# Patient Record
Sex: FEMALE | Race: WHITE | NOT HISPANIC OR LATINO | URBAN - METROPOLITAN AREA
[De-identification: names, ages, dates, MRNs, and addresses within clinical notes are randomized per-mention and may not be internally consistent; named-entity substitution may affect disease eponyms.]

---

## 2017-02-17 ENCOUNTER — APPOINTMENT (RX ONLY)
Dept: URBAN - METROPOLITAN AREA OTHER 13 | Facility: OTHER | Age: 72
Setting detail: DERMATOLOGY
End: 2017-02-17

## 2017-02-17 DIAGNOSIS — L82.1 OTHER SEBORRHEIC KERATOSIS: ICD-10-CM

## 2017-02-17 DIAGNOSIS — L66.1 LICHEN PLANOPILARIS: ICD-10-CM

## 2017-02-17 DIAGNOSIS — L66.11 CLASSIC LICHEN PLANOPILARIS: ICD-10-CM

## 2017-02-17 PROCEDURE — ? INTRALESIONAL KENALOG

## 2017-02-17 PROCEDURE — ? OTHER

## 2017-02-17 PROCEDURE — ? TREATMENT REGIMEN

## 2017-02-17 PROCEDURE — 11901 INJECT SKIN LESIONS >7: CPT

## 2017-02-17 PROCEDURE — ? COUNSELING

## 2017-02-17 PROCEDURE — 99212 OFFICE O/P EST SF 10 MIN: CPT | Mod: 25

## 2017-02-17 ASSESSMENT — LOCATION DETAILED DESCRIPTION DERM
LOCATION DETAILED: POSTERIOR MID-PARIETAL SCALP
LOCATION DETAILED: RIGHT SUPERIOR PARIETAL SCALP
LOCATION DETAILED: LEFT CENTRAL PARIETAL SCALP
LOCATION DETAILED: LEFT SUPERIOR OCCIPITAL SCALP
LOCATION DETAILED: RIGHT CENTRAL PARIETAL SCALP
LOCATION DETAILED: LEFT SUPERIOR PARIETAL SCALP
LOCATION DETAILED: LEFT CENTRAL TEMPLE
LOCATION DETAILED: MID-OCCIPITAL SCALP
LOCATION DETAILED: RIGHT SUPERIOR OCCIPITAL SCALP
LOCATION DETAILED: LEFT CENTRAL FRONTAL SCALP

## 2017-02-17 ASSESSMENT — LOCATION SIMPLE DESCRIPTION DERM
LOCATION SIMPLE: POSTERIOR SCALP
LOCATION SIMPLE: LEFT TEMPLE
LOCATION SIMPLE: RIGHT OCCIPITAL SCALP
LOCATION SIMPLE: LEFT SCALP
LOCATION SIMPLE: SCALP
LOCATION SIMPLE: LEFT OCCIPITAL SCALP

## 2017-02-17 ASSESSMENT — LOCATION ZONE DERM
LOCATION ZONE: SCALP
LOCATION ZONE: FACE

## 2017-02-17 NOTE — PROCEDURE: INTRALESIONAL KENALOG
Consent: The risks of atrophy were reviewed with the patient.
Medical Necessity Clause: This procedure was medically necessary because the lesions that were treated were:
Detail Level: Detailed
Kenalog Preparation: Kenalog
Administered By (Optional): Dr. Corral
X Size Of Lesion In Cm (Optional): 0
Include Z78.9 (Other Specified Conditions Influencing Health Status) As An Associated Diagnosis?: No
Concentration Of Kenalog Solution Injected (Mg/Ml): 3.3

## 2017-02-17 NOTE — PROCEDURE: OTHER
Detail Level: Simple
Other (Free Text): red follicular papules
Note Text (......Xxx Chief Complaint.): This diagnosis correlates with the

## 2017-03-06 ENCOUNTER — RX ONLY (OUTPATIENT)
Age: 72
Setting detail: RX ONLY
End: 2017-03-06

## 2017-03-06 RX ORDER — FLUOCINONIDE 0.5 MG/ML
SOLUTION TOPICAL
Qty: 1 | Refills: 3 | Status: ERX

## 2017-04-21 ENCOUNTER — APPOINTMENT (RX ONLY)
Dept: URBAN - METROPOLITAN AREA OTHER 13 | Facility: OTHER | Age: 72
Setting detail: DERMATOLOGY
End: 2017-04-21

## 2017-04-21 DIAGNOSIS — L66.11 CLASSIC LICHEN PLANOPILARIS: ICD-10-CM

## 2017-04-21 DIAGNOSIS — L66.1 LICHEN PLANOPILARIS: ICD-10-CM

## 2017-04-21 PROCEDURE — ? INTRALESIONAL KENALOG

## 2017-04-21 PROCEDURE — ? TREATMENT REGIMEN

## 2017-04-21 PROCEDURE — 11900 INJECT SKIN LESIONS </W 7: CPT

## 2017-04-21 PROCEDURE — ? COUNSELING

## 2017-04-21 PROCEDURE — ? OTHER

## 2017-04-21 PROCEDURE — ? PRESCRIPTION

## 2017-04-21 RX ORDER — FLUOCINONIDE 0.5 MG/ML
SOLUTION TOPICAL QD
Qty: 1 | Refills: 3 | Status: ERX

## 2017-04-21 ASSESSMENT — LOCATION DETAILED DESCRIPTION DERM
LOCATION DETAILED: LEFT SUPERIOR PARIETAL SCALP
LOCATION DETAILED: LEFT CENTRAL PARIETAL SCALP
LOCATION DETAILED: RIGHT SUPERIOR PARIETAL SCALP
LOCATION DETAILED: RIGHT CENTRAL PARIETAL SCALP
LOCATION DETAILED: MID-OCCIPITAL SCALP

## 2017-04-21 ASSESSMENT — LOCATION SIMPLE DESCRIPTION DERM
LOCATION SIMPLE: POSTERIOR SCALP
LOCATION SIMPLE: SCALP

## 2017-04-21 ASSESSMENT — LOCATION ZONE DERM: LOCATION ZONE: SCALP

## 2017-04-21 NOTE — PROCEDURE: OTHER
Detail Level: Simple
Other (Free Text): red follicular erythema with scale, low level erythema in all affected areas with left parietal scalp not inflamed today.  Will continue to treat unless does not improve.  Consider excimer laser if not improved.
Note Text (......Xxx Chief Complaint.): This diagnosis correlates with the

## 2017-04-21 NOTE — PROCEDURE: INTRALESIONAL KENALOG
X Size Of Lesion In Cm (Optional): 0
Medical Necessity Clause: This procedure was medically necessary because the lesions that were treated were:
Concentration Of Kenalog Solution Injected (Mg/Ml): 2.0
Consent: The risks of atrophy were reviewed with the patient.
Administered By (Optional): Dr. Corral
Include Z78.9 (Other Specified Conditions Influencing Health Status) As An Associated Diagnosis?: No
Kenalog Preparation: Kenalog
Detail Level: Detailed
Total Volume (Ccs): 0.3

## 2017-06-15 ENCOUNTER — APPOINTMENT (RX ONLY)
Dept: URBAN - METROPOLITAN AREA OTHER 13 | Facility: OTHER | Age: 72
Setting detail: DERMATOLOGY
End: 2017-06-15

## 2017-06-15 DIAGNOSIS — L66.11 CLASSIC LICHEN PLANOPILARIS: ICD-10-CM | Status: IMPROVED

## 2017-06-15 DIAGNOSIS — L82.1 OTHER SEBORRHEIC KERATOSIS: ICD-10-CM

## 2017-06-15 DIAGNOSIS — L66.1 LICHEN PLANOPILARIS: ICD-10-CM | Status: IMPROVED

## 2017-06-15 PROCEDURE — ? COUNSELING

## 2017-06-15 PROCEDURE — ? TREATMENT REGIMEN

## 2017-06-15 PROCEDURE — 11901 INJECT SKIN LESIONS >7: CPT

## 2017-06-15 PROCEDURE — ? INTRALESIONAL KENALOG

## 2017-06-15 PROCEDURE — ? LIQUID NITROGEN (COSMETIC)

## 2017-06-15 ASSESSMENT — LOCATION ZONE DERM
LOCATION ZONE: FACE
LOCATION ZONE: SCALP

## 2017-06-15 ASSESSMENT — LOCATION DETAILED DESCRIPTION DERM
LOCATION DETAILED: RIGHT OCCIPITAL SCALP
LOCATION DETAILED: RIGHT MEDIAL FOREHEAD
LOCATION DETAILED: LEFT EYEBROW
LOCATION DETAILED: RIGHT SUPERIOR PARIETAL SCALP
LOCATION DETAILED: LEFT INFERIOR MEDIAL FOREHEAD
LOCATION DETAILED: RIGHT SUPERIOR OCCIPITAL SCALP
LOCATION DETAILED: MID-OCCIPITAL SCALP
LOCATION DETAILED: LEFT OCCIPITAL SCALP

## 2017-06-15 ASSESSMENT — LOCATION SIMPLE DESCRIPTION DERM
LOCATION SIMPLE: SCALP
LOCATION SIMPLE: POSTERIOR SCALP
LOCATION SIMPLE: LEFT EYEBROW
LOCATION SIMPLE: LEFT FOREHEAD
LOCATION SIMPLE: RIGHT FOREHEAD

## 2017-06-15 NOTE — PROCEDURE: LIQUID NITROGEN (COSMETIC)
Consent: The patient's consent was obtained including but not limited to risks of crusting, scabbing, blistering, scarring, darker or lighter pigmentary change, recurrence, incomplete removal and infection. The patient understands that the procedure is cosmetic in nature and is not covered by insurance.
Detail Level: Detailed
Post-Care Instructions: I reviewed with the patient in detail post-care instructions. Patient is to wear sunprotection, and avoid picking at any of the treated lesions. Pt may apply Vaseline to crusted or scabbing areas.
Price (Use Numbers Only, No Special Characters Or $): 0
Render Post-Care Instructions In Note?: no

## 2017-06-15 NOTE — PROCEDURE: INTRALESIONAL KENALOG
Medical Necessity Clause: This procedure was medically necessary because the lesions that were treated were:
Kenalog Preparation: Kenalog
Consent: The risks of atrophy were reviewed with the patient.
Include Z78.9 (Other Specified Conditions Influencing Health Status) As An Associated Diagnosis?: No
Total Volume (Ccs): 1.8
Detail Level: Detailed
Concentration Of Kenalog Solution Injected (Mg/Ml): 3.3
X Size Of Lesion In Cm (Optional): 0
Administered By (Optional): Dr. Corral

## 2017-08-15 ENCOUNTER — APPOINTMENT (RX ONLY)
Dept: URBAN - METROPOLITAN AREA OTHER 13 | Facility: OTHER | Age: 72
Setting detail: DERMATOLOGY
End: 2017-08-15

## 2017-08-15 DIAGNOSIS — L66.1 LICHEN PLANOPILARIS: ICD-10-CM

## 2017-08-15 DIAGNOSIS — L66.11 CLASSIC LICHEN PLANOPILARIS: ICD-10-CM

## 2017-08-15 PROCEDURE — ? TREATMENT REGIMEN

## 2017-08-15 PROCEDURE — ? COUNSELING

## 2017-08-15 PROCEDURE — ? INTRALESIONAL KENALOG

## 2017-08-15 PROCEDURE — 11901 INJECT SKIN LESIONS >7: CPT

## 2017-08-15 ASSESSMENT — LOCATION DETAILED DESCRIPTION DERM
LOCATION DETAILED: RIGHT SUPERIOR PARIETAL SCALP
LOCATION DETAILED: RIGHT CENTRAL PARIETAL SCALP

## 2017-08-15 ASSESSMENT — LOCATION SIMPLE DESCRIPTION DERM: LOCATION SIMPLE: SCALP

## 2017-08-15 ASSESSMENT — LOCATION ZONE DERM: LOCATION ZONE: SCALP

## 2017-11-03 ENCOUNTER — APPOINTMENT (RX ONLY)
Dept: URBAN - METROPOLITAN AREA OTHER 13 | Facility: OTHER | Age: 72
Setting detail: DERMATOLOGY
End: 2017-11-03

## 2017-11-03 DIAGNOSIS — D22 MELANOCYTIC NEVI: ICD-10-CM

## 2017-11-03 DIAGNOSIS — L66.11 CLASSIC LICHEN PLANOPILARIS: ICD-10-CM

## 2017-11-03 DIAGNOSIS — D485 NEOPLASM OF UNCERTAIN BEHAVIOR OF SKIN: ICD-10-CM

## 2017-11-03 DIAGNOSIS — L66.1 LICHEN PLANOPILARIS: ICD-10-CM

## 2017-11-03 DIAGNOSIS — L82.1 OTHER SEBORRHEIC KERATOSIS: ICD-10-CM

## 2017-11-03 PROBLEM — D22.5 MELANOCYTIC NEVI OF TRUNK: Status: ACTIVE | Noted: 2017-11-03

## 2017-11-03 PROBLEM — D48.5 NEOPLASM OF UNCERTAIN BEHAVIOR OF SKIN: Status: ACTIVE | Noted: 2017-11-03

## 2017-11-03 PROCEDURE — ? COUNSELING

## 2017-11-03 PROCEDURE — 11101: CPT

## 2017-11-03 PROCEDURE — 11900 INJECT SKIN LESIONS </W 7: CPT

## 2017-11-03 PROCEDURE — 99213 OFFICE O/P EST LOW 20 MIN: CPT | Mod: 25

## 2017-11-03 PROCEDURE — 11100: CPT

## 2017-11-03 PROCEDURE — ? LIQUID NITROGEN (COSMETIC)

## 2017-11-03 PROCEDURE — ? BIOPSY BY SHAVE METHOD

## 2017-11-03 PROCEDURE — ? TREATMENT REGIMEN

## 2017-11-03 PROCEDURE — ? INTRALESIONAL KENALOG

## 2017-11-03 ASSESSMENT — LOCATION DETAILED DESCRIPTION DERM
LOCATION DETAILED: RIGHT SUPERIOR UPPER BACK
LOCATION DETAILED: RIGHT FOREHEAD
LOCATION DETAILED: RIGHT CLAVICULAR NECK
LOCATION DETAILED: RIGHT INFERIOR MEDIAL UPPER BACK
LOCATION DETAILED: LEFT SUPERIOR CENTRAL BUCCAL CHEEK
LOCATION DETAILED: RIGHT INFERIOR CENTRAL MALAR CHEEK
LOCATION DETAILED: EPIGASTRIC SKIN
LOCATION DETAILED: RIGHT SUPERIOR PARIETAL SCALP
LOCATION DETAILED: RIGHT CENTRAL PARIETAL SCALP
LOCATION DETAILED: LEFT MEDIAL UPPER BACK

## 2017-11-03 ASSESSMENT — LOCATION SIMPLE DESCRIPTION DERM
LOCATION SIMPLE: RIGHT CHEEK
LOCATION SIMPLE: ABDOMEN
LOCATION SIMPLE: LEFT UPPER BACK
LOCATION SIMPLE: LEFT CHEEK
LOCATION SIMPLE: RIGHT UPPER BACK
LOCATION SIMPLE: RIGHT ANTERIOR NECK
LOCATION SIMPLE: SCALP
LOCATION SIMPLE: RIGHT FOREHEAD

## 2017-11-03 ASSESSMENT — LOCATION ZONE DERM
LOCATION ZONE: TRUNK
LOCATION ZONE: FACE
LOCATION ZONE: NECK
LOCATION ZONE: SCALP

## 2017-11-03 NOTE — PROCEDURE: INTRALESIONAL KENALOG
Detail Level: Detailed
Consent: The risks of atrophy were reviewed with the patient.
X Size Of Lesion In Cm (Optional): 0
Total Volume (Ccs): 1.5
Kenalog Preparation: Kenalog
Administered By (Optional): Dr. Corral
Medical Necessity Clause: This procedure was medically necessary because the lesions that were treated were:
Include Z78.9 (Other Specified Conditions Influencing Health Status) As An Associated Diagnosis?: No
Concentration Of Kenalog Solution Injected (Mg/Ml): 3.3

## 2017-11-03 NOTE — PROCEDURE: BIOPSY BY SHAVE METHOD
Type Of Destruction Used: Curettage
Billing Type: Third-Party Bill
Destruction After The Procedure: No
Biopsy Method: Dermablade
Curettage Text: The wound bed was treated with curettage after the biopsy was performed.
Detail Level: Detailed
X Size Of Lesion In Cm: 0
Electrodesiccation Text: The wound bed was treated with electrodesiccation after the biopsy was performed.
Consent: Written consent was obtained and risks were reviewed including but not limited to scarring, infection, bleeding, scabbing, incomplete removal, nerve damage and allergy to anesthesia.
Biopsy Type: H and E
Wound Care: Bacitracin
Dressing: bandage
Anesthesia Volume In Cc: 1
Electrodesiccation And Curettage Text: The wound bed was treated with electrodesiccation and curettage after the biopsy was performed.
Post-Care Instructions: I reviewed with the patient in detail post-care instructions. Patient is to keep the biopsy site dry overnight, and then apply bacitracin twice daily until healed. Patient may apply hydrogen peroxide soaks to remove any crusting.
Cryotherapy Text: The wound bed was treated with cryotherapy after the biopsy was performed.
Silver Nitrate Text: The wound bed was treated with silver nitrate after the biopsy was performed.
Anesthesia Type: 1% lidocaine with epinephrine
Lab Facility: [id number]
Lab: [id number]
Notification Instructions: Patient will be notified of biopsy results. However, patient instructed to call the office if not contacted within 2 weeks.
Hemostasis: Drysol

## 2018-02-15 ENCOUNTER — APPOINTMENT (RX ONLY)
Dept: URBAN - METROPOLITAN AREA OTHER 13 | Facility: OTHER | Age: 73
Setting detail: DERMATOLOGY
End: 2018-02-15

## 2018-02-15 DIAGNOSIS — L66.11 CLASSIC LICHEN PLANOPILARIS: ICD-10-CM | Status: IMPROVED

## 2018-02-15 DIAGNOSIS — L66.1 LICHEN PLANOPILARIS: ICD-10-CM | Status: IMPROVED

## 2018-02-15 PROCEDURE — 99212 OFFICE O/P EST SF 10 MIN: CPT

## 2018-02-15 PROCEDURE — ? TREATMENT REGIMEN

## 2018-02-15 PROCEDURE — ? COUNSELING

## 2018-08-31 NOTE — PROCEDURE: INTRALESIONAL KENALOG
See initial evaluation in POC.     Viridiana Cruz, PT, DPT  
Administered By (Optional): Dr. Corral
Medical Necessity Clause: This procedure was medically necessary because the lesions that were treated were:
X Size Of Lesion In Cm (Optional): 0
Concentration Of Kenalog Solution Injected (Mg/Ml): 3.3
Total Volume (Ccs): 2.0
Consent: The risks of atrophy were reviewed with the patient.
Detail Level: Detailed
Include Z78.9 (Other Specified Conditions Influencing Health Status) As An Associated Diagnosis?: No
Kenalog Preparation: Kenalog

## 2020-09-22 ENCOUNTER — OFFICE VISIT (OUTPATIENT)
Dept: RURAL CLINIC 6 | Facility: CLINIC | Age: 75
End: 2020-09-22
Payer: MEDICARE

## 2020-09-22 DIAGNOSIS — K59.00 CONSTIPATION: ICD-10-CM

## 2020-09-22 DIAGNOSIS — R19.7 DIARRHEA: ICD-10-CM

## 2020-09-22 PROCEDURE — G8420 CALC BMI NORM PARAMETERS: HCPCS | Performed by: INTERNAL MEDICINE

## 2020-09-22 PROCEDURE — 99214 OFFICE O/P EST MOD 30 MIN: CPT | Performed by: INTERNAL MEDICINE

## 2020-09-22 PROCEDURE — G8427 DOCREV CUR MEDS BY ELIG CLIN: HCPCS | Performed by: INTERNAL MEDICINE

## 2020-09-22 PROCEDURE — 1036F TOBACCO NON-USER: CPT | Performed by: INTERNAL MEDICINE

## 2020-09-22 PROCEDURE — 3017F COLORECTAL CA SCREEN DOC REV: CPT | Performed by: INTERNAL MEDICINE

## 2020-09-22 RX ORDER — HYDROCHLOROTHIAZIDE 25 MG/1
TABLET ORAL
Qty: 0 | Refills: 0 | Status: ACTIVE | COMMUNITY
Start: 1900-01-01

## 2020-09-22 RX ORDER — ASPIRIN 81 MG/1
TAKE 1 TABLET (81 MG) BY ORAL ROUTE ONCE DAILY TABLET, COATED ORAL 1
Qty: 0 | Refills: 0 | Status: ACTIVE | COMMUNITY
Start: 1900-01-01

## 2020-09-22 RX ORDER — CHLORHEXIDINE GLUCONATE 4 %
TAKE 1 TABLET BY ORAL ROUTE DAILY LIQUID (ML) TOPICAL 1
Qty: 0 | Refills: 0 | Status: ACTIVE | COMMUNITY
Start: 1900-01-01

## 2020-09-22 RX ORDER — VENLAFAXINE HYDROCHLORIDE 75 MG/1
TABLET ORAL
Qty: 0 | Refills: 0 | Status: ACTIVE | COMMUNITY
Start: 1900-01-01

## 2020-09-22 RX ORDER — ZOLPIDEM TARTRATE 5 MG/1
TABLET, FILM COATED ORAL
Qty: 0 | Refills: 0 | Status: ACTIVE | COMMUNITY
Start: 1900-01-01

## 2020-09-22 RX ORDER — TRAZODONE HYDROCHLORIDE 150 MG/1
TAKE 1 TABLET BY ORAL ROUTE DAILY TABLET ORAL 1
Qty: 0 | Refills: 0 | Status: ACTIVE | COMMUNITY
Start: 1900-01-01

## 2020-09-22 RX ORDER — VENLAFAXINE HYDROCHLORIDE 75 MG/1
TAKE 1 CAPSULE (75 MG) BY ORAL ROUTE ONCE DAILY CAPSULE, EXTENDED RELEASE ORAL 1
Qty: 0 | Refills: 0 | Status: ACTIVE | COMMUNITY
Start: 1900-01-01

## 2020-09-22 RX ORDER — ALBUTEROL SULFATE 90 UG/1
AEROSOL, METERED RESPIRATORY (INHALATION)
Qty: 0 | Refills: 0 | Status: ACTIVE | COMMUNITY
Start: 1900-01-01

## 2020-09-22 NOTE — PHYSICAL EXAM CARDIOVASCULAR:
[FreeTextEntry1] : It    appears    that  the    patient's  cognitive complaints  are    related    to the    mood  issues. She    reports     a  h/o  depression;  haven't  been    seen   by    the    mood    specialist    for    a  while.\par She    will  be    referred    to  Psychiatrist  and   Psychologist.  She    has   an  appointment     with   Endocrinologist.   no edema, no murmurs, regular rate and rhythm

## 2020-09-22 NOTE — HPI-OTHER HISTORIES
>>> prior visits  This is a follow-up appointment for this patient, a 74 year old /White female, after a previous visit on 03/27/2018, for an evaluation for irritable bowel syndrome. The patient's irritable bowel syndrome is constipation predominant with a recent short lived bout of diarrhea. This has now resolved.. Currently, the patient has complaints of constipation. The patient's symptoms are currently constipation after recently having had diarrhea. She is now back to being constipated and she's taking Amitiza every 2 to 3 days. If she takes the Amitiza every day, she develops diarrhea. The patient Is taking their prescribed medications as listed in the data base of the EMR.. She denies any other significant GI history The patient has had previous colonoscopy. The colonoscopy was performed approximately 2 years ago. The colonoscopy showed no significant abnormality. She says that lately her stools have been black in color. She has not been takinbg Pepto Bismol or iron. No abdominal pain, nausea, vomiting, rectal bleeding or melena. Appetite is fine and weight is stable. No fever, chills or sweats. No heartburn, regurgitation, dysphagia, coughing or throat clearing.   >> 12/10/2019 Doing well, regular bowels with SHAY only, no Amitiza at present. No abd pain or UGI symptoms. no bleeding.

## 2020-09-22 NOTE — HPI-TODAY'S VISIT:
Follow-up of alternating constipation and diarrhea She tends toward constipation when she takes any type of laxatives including stool softeners.  She then has frequent diarrhea and frequent episodes of incontinence including nocturnal incontinence.  There is a vague cramping in the lower abdomen but no severe pain.  There is been no GI bleeding.  She has gained weight recently.  Her last colonoscopy in 2017 was normal including multiple biopsies of colon and ileum.  She has had intermittent chronic diarrhea for a number of years.  The incontinence however is a recent issue.  She has no history of rectal injury rectal surgery or low back problems.  There is no urinary incontinence.

## 2020-11-17 ENCOUNTER — WEB ENCOUNTER (OUTPATIENT)
Dept: RURAL CLINIC 6 | Facility: CLINIC | Age: 75
End: 2020-11-17

## 2020-11-17 ENCOUNTER — OFFICE VISIT (OUTPATIENT)
Dept: RURAL CLINIC 6 | Facility: CLINIC | Age: 75
End: 2020-11-17
Payer: MEDICARE

## 2020-11-17 DIAGNOSIS — R19.7 DIARRHEA: ICD-10-CM

## 2020-11-17 DIAGNOSIS — K59.00 CONSTIPATION: ICD-10-CM

## 2020-11-17 PROCEDURE — G8427 DOCREV CUR MEDS BY ELIG CLIN: HCPCS | Performed by: INTERNAL MEDICINE

## 2020-11-17 PROCEDURE — G8482 FLU IMMUNIZE ORDER/ADMIN: HCPCS | Performed by: INTERNAL MEDICINE

## 2020-11-17 PROCEDURE — G9903 PT SCRN TBCO ID AS NON USER: HCPCS | Performed by: INTERNAL MEDICINE

## 2020-11-17 PROCEDURE — G8420 CALC BMI NORM PARAMETERS: HCPCS | Performed by: INTERNAL MEDICINE

## 2020-11-17 PROCEDURE — 99213 OFFICE O/P EST LOW 20 MIN: CPT | Performed by: INTERNAL MEDICINE

## 2020-11-17 RX ORDER — ZOLPIDEM TARTRATE 5 MG/1
TABLET, FILM COATED ORAL
Qty: 0 | Refills: 0 | Status: ACTIVE | COMMUNITY
Start: 1900-01-01

## 2020-11-17 RX ORDER — ASPIRIN 81 MG/1
TAKE 1 TABLET (81 MG) BY ORAL ROUTE ONCE DAILY TABLET, COATED ORAL 1
Qty: 0 | Refills: 0 | Status: ACTIVE | COMMUNITY
Start: 1900-01-01

## 2020-11-17 RX ORDER — ALBUTEROL SULFATE 90 UG/1
AEROSOL, METERED RESPIRATORY (INHALATION)
Qty: 0 | Refills: 0 | Status: ACTIVE | COMMUNITY
Start: 1900-01-01

## 2020-11-17 RX ORDER — HYDROCHLOROTHIAZIDE 25 MG/1
TABLET ORAL
Qty: 0 | Refills: 0 | Status: ACTIVE | COMMUNITY
Start: 1900-01-01

## 2020-11-17 RX ORDER — CHLORHEXIDINE GLUCONATE 4 %
TAKE 1 TABLET BY ORAL ROUTE DAILY LIQUID (ML) TOPICAL 1
Qty: 0 | Refills: 0 | Status: ACTIVE | COMMUNITY
Start: 1900-01-01

## 2020-11-17 RX ORDER — TRAZODONE HYDROCHLORIDE 150 MG/1
TAKE 1 TABLET BY ORAL ROUTE DAILY TABLET ORAL 1
Qty: 0 | Refills: 0 | Status: ACTIVE | COMMUNITY
Start: 1900-01-01

## 2020-11-17 RX ORDER — VENLAFAXINE HYDROCHLORIDE 75 MG/1
TABLET ORAL
Qty: 0 | Refills: 0 | Status: ACTIVE | COMMUNITY
Start: 1900-01-01

## 2020-11-17 RX ORDER — VENLAFAXINE HYDROCHLORIDE 75 MG/1
TAKE 1 CAPSULE (75 MG) BY ORAL ROUTE ONCE DAILY CAPSULE, EXTENDED RELEASE ORAL 1
Qty: 0 | Refills: 0 | Status: ACTIVE | COMMUNITY
Start: 1900-01-01

## 2020-11-17 NOTE — PHYSICAL EXAM HENT:
Head, normocephalic, atraumatic, Face, Face within normal limits, Ears, External ears within normal limits, Nose/Nasopharynx, External nose  normal appearance, nares patent, no nasal discharge, Mouth and Throat, Oral cavity appearance normal, Breath odor normal, Lips, Appearance normal no

## 2020-11-17 NOTE — HPI-TODAY'S VISIT:
Follow-up alternating constipation and diarrhea.  She has had minimal constipation recently.  She occasionally has diarrhea which describes 2-3 soft to loose stools per day.  There have been no incontinence episodes, abdominal pain, or nocturnal stools.  There is been no bleeding.  She otherwise feels healthy.  >>last visit   Follow-up of alternating constipation and diarrhea She tends toward constipation when she takes any type of laxatives including stool softeners.  She then has frequent diarrhea and frequent episodes of incontinence including nocturnal incontinence.  There is a vague cramping in the lower abdomen but no severe pain.  There is been no GI bleeding.  She has gained weight recently.  Her last colonoscopy in 2017 was normal including multiple biopsies of colon and ileum.  She has had intermittent chronic diarrhea for a number of years.  The incontinence however is a recent issue.  She has no history of rectal injury rectal surgery or low back problems.  There is no urinary incontinence.

## 2021-05-18 ENCOUNTER — OFFICE VISIT (OUTPATIENT)
Dept: RURAL CLINIC 6 | Facility: CLINIC | Age: 76
End: 2021-05-18
Payer: MEDICARE

## 2021-05-18 ENCOUNTER — WEB ENCOUNTER (OUTPATIENT)
Dept: RURAL CLINIC 6 | Facility: CLINIC | Age: 76
End: 2021-05-18

## 2021-05-18 DIAGNOSIS — R19.7 DIARRHEA: ICD-10-CM

## 2021-05-18 DIAGNOSIS — K59.00 CONSTIPATION: ICD-10-CM

## 2021-05-18 PROCEDURE — 99213 OFFICE O/P EST LOW 20 MIN: CPT | Performed by: INTERNAL MEDICINE

## 2021-05-18 RX ORDER — HYDROCHLOROTHIAZIDE 25 MG/1
TABLET ORAL
Qty: 0 | Refills: 0 | Status: ACTIVE | COMMUNITY
Start: 1900-01-01

## 2021-05-18 RX ORDER — ASPIRIN 81 MG/1
TAKE 1 TABLET (81 MG) BY ORAL ROUTE ONCE DAILY TABLET, COATED ORAL 1
Qty: 0 | Refills: 0 | Status: ACTIVE | COMMUNITY
Start: 1900-01-01

## 2021-05-18 RX ORDER — VENLAFAXINE HYDROCHLORIDE 75 MG/1
TAKE 1 CAPSULE (75 MG) BY ORAL ROUTE ONCE DAILY CAPSULE, EXTENDED RELEASE ORAL 1
Qty: 0 | Refills: 0 | Status: ACTIVE | COMMUNITY
Start: 1900-01-01

## 2021-05-18 RX ORDER — TRAZODONE HYDROCHLORIDE 150 MG/1
TAKE 1 TABLET BY ORAL ROUTE DAILY TABLET ORAL 1
Qty: 0 | Refills: 0 | Status: ACTIVE | COMMUNITY
Start: 1900-01-01

## 2021-05-18 RX ORDER — ZOLPIDEM TARTRATE 5 MG/1
TABLET, FILM COATED ORAL
Qty: 0 | Refills: 0 | Status: ACTIVE | COMMUNITY
Start: 1900-01-01

## 2021-05-18 RX ORDER — VENLAFAXINE HYDROCHLORIDE 75 MG/1
TABLET ORAL
Qty: 0 | Refills: 0 | Status: ACTIVE | COMMUNITY
Start: 1900-01-01

## 2021-05-18 RX ORDER — ALBUTEROL SULFATE 90 UG/1
AEROSOL, METERED RESPIRATORY (INHALATION)
Qty: 0 | Refills: 0 | Status: ACTIVE | COMMUNITY
Start: 1900-01-01

## 2021-05-18 RX ORDER — CHLORHEXIDINE GLUCONATE 4 %
TAKE 1 TABLET BY ORAL ROUTE DAILY LIQUID (ML) TOPICAL 1
Qty: 0 | Refills: 0 | Status: ACTIVE | COMMUNITY
Start: 1900-01-01

## 2021-05-18 NOTE — HPI-TODAY'S VISIT:
75-year-old woman with constipation and occasional diarrhea.  She was last seen last year and previously had a colonoscopy 2017 that was unremarkable.  She has had no bleeding.  She has rare diarrhea at this time predominantly constipation.  She does not take laxatives fearing diarrhea recurrence.  There have been no ongoing cardiac or respiratory problems or other issues.  Her health in general has been fairly good. Follow-up alternating constipation and diarrhea.  She has had minimal constipation recently.  She occasionally has diarrhea which describes 2-3 soft to loose stools per day.  There have been no incontinence episodes, abdominal pain, or nocturnal stools.  There is been no bleeding.  She otherwise feels healthy.  >>last visit   Follow-up of alternating constipation and diarrhea She tends toward constipation when she takes any type of laxatives including stool softeners.  She then has frequent diarrhea and frequent episodes of incontinence including nocturnal incontinence.  There is a vague cramping in the lower abdomen but no severe pain.  There is been no GI bleeding.  She has gained weight recently.  Her last colonoscopy in 2017 was normal including multiple biopsies of colon and ileum.  She has had intermittent chronic diarrhea for a number of years.  The incontinence however is a recent issue.  She has no history of rectal injury rectal surgery or low back problems.  There is no urinary incontinence.

## 2021-09-21 ENCOUNTER — WEB ENCOUNTER (OUTPATIENT)
Dept: RURAL CLINIC 2 | Facility: CLINIC | Age: 76
End: 2021-09-21

## 2021-09-21 ENCOUNTER — OFFICE VISIT (OUTPATIENT)
Dept: RURAL CLINIC 6 | Facility: CLINIC | Age: 76
End: 2021-09-21
Payer: MEDICARE

## 2021-09-21 DIAGNOSIS — R19.8 ALTERNATING CONSTIPATION AND DIARRHEA: ICD-10-CM

## 2021-09-21 DIAGNOSIS — K57.30 DIVERTICULOSIS LARGE INTESTINE W/O PERFORATION OR ABSCESS W/O BLEEDING: ICD-10-CM

## 2021-09-21 PROBLEM — 724538004: Status: ACTIVE | Noted: 2021-09-21

## 2021-09-21 PROCEDURE — 99213 OFFICE O/P EST LOW 20 MIN: CPT | Performed by: INTERNAL MEDICINE

## 2021-09-21 RX ORDER — TRAZODONE HYDROCHLORIDE 150 MG/1
TAKE 1 TABLET BY ORAL ROUTE DAILY TABLET ORAL 1
Qty: 0 | Refills: 0 | Status: ACTIVE | COMMUNITY
Start: 1900-01-01

## 2021-09-21 RX ORDER — ASPIRIN 81 MG/1
TAKE 1 TABLET (81 MG) BY ORAL ROUTE ONCE DAILY TABLET, COATED ORAL 1
Qty: 0 | Refills: 0 | Status: ACTIVE | COMMUNITY
Start: 1900-01-01

## 2021-09-21 RX ORDER — VENLAFAXINE HYDROCHLORIDE 75 MG/1
TABLET ORAL
Qty: 0 | Refills: 0 | Status: ACTIVE | COMMUNITY
Start: 1900-01-01

## 2021-09-21 RX ORDER — HYDROCHLOROTHIAZIDE 25 MG/1
TABLET ORAL
Qty: 0 | Refills: 0 | Status: ACTIVE | COMMUNITY
Start: 1900-01-01

## 2021-09-21 RX ORDER — ALBUTEROL SULFATE 90 UG/1
AEROSOL, METERED RESPIRATORY (INHALATION)
Qty: 0 | Refills: 0 | Status: ACTIVE | COMMUNITY
Start: 1900-01-01

## 2021-09-21 RX ORDER — VENLAFAXINE HYDROCHLORIDE 75 MG/1
TAKE 1 CAPSULE (75 MG) BY ORAL ROUTE ONCE DAILY CAPSULE, EXTENDED RELEASE ORAL 1
Qty: 0 | Refills: 0 | Status: ACTIVE | COMMUNITY
Start: 1900-01-01

## 2021-09-21 RX ORDER — CHLORHEXIDINE GLUCONATE 4 %
TAKE 1 TABLET BY ORAL ROUTE DAILY LIQUID (ML) TOPICAL 1
Qty: 0 | Refills: 0 | Status: ACTIVE | COMMUNITY
Start: 1900-01-01

## 2021-09-21 RX ORDER — ZOLPIDEM TARTRATE 5 MG/1
TABLET, FILM COATED ORAL
Qty: 0 | Refills: 0 | Status: ACTIVE | COMMUNITY
Start: 1900-01-01

## 2021-09-21 NOTE — HPI-TODAY'S VISIT:
75-year-old female patient of Dr. Mendoza.  She has had alternating bowel habits beginning with significant constipation over a number of days after which she took MiraLAX last week.  Since then she has had small loose stools possibly with incomplete evacuation.  She has no prior history of fecal impaction.  She has no distention or abdominal pain other than a vague discomfort her last colonoscopy was about 2011 she had 2000 diverticulitis in 2014 she had AVMs with her colon in 2011.  She has had no bleeding.  Her weight has been stable.

## 2022-08-02 ENCOUNTER — OFFICE VISIT (OUTPATIENT)
Dept: RURAL CLINIC 6 | Facility: CLINIC | Age: 77
End: 2022-08-02
Payer: MEDICARE

## 2022-08-02 VITALS
HEIGHT: 62 IN | DIASTOLIC BLOOD PRESSURE: 79 MMHG | SYSTOLIC BLOOD PRESSURE: 128 MMHG | BODY MASS INDEX: 24.48 KG/M2 | TEMPERATURE: 98.2 F | WEIGHT: 133 LBS | HEART RATE: 65 BPM

## 2022-08-02 DIAGNOSIS — K62.89 RECTAL PAIN: ICD-10-CM

## 2022-08-02 DIAGNOSIS — K59.00 CONSTIPATION, UNSPECIFIED CONSTIPATION TYPE: ICD-10-CM

## 2022-08-02 PROBLEM — 14760008: Status: ACTIVE | Noted: 2022-08-02

## 2022-08-02 PROCEDURE — 99213 OFFICE O/P EST LOW 20 MIN: CPT | Performed by: INTERNAL MEDICINE

## 2022-08-02 RX ORDER — TRAZODONE HYDROCHLORIDE 150 MG/1
TAKE 1 TABLET BY ORAL ROUTE DAILY TABLET ORAL 1
Qty: 0 | Refills: 0 | Status: ACTIVE | COMMUNITY
Start: 1900-01-01

## 2022-08-02 RX ORDER — VENLAFAXINE HYDROCHLORIDE 75 MG/1
TABLET ORAL
Qty: 0 | Refills: 0 | Status: ACTIVE | COMMUNITY
Start: 1900-01-01

## 2022-08-02 RX ORDER — ZOLPIDEM TARTRATE 5 MG/1
TABLET, FILM COATED ORAL
Qty: 0 | Refills: 0 | Status: ACTIVE | COMMUNITY
Start: 1900-01-01

## 2022-08-02 RX ORDER — CHLORHEXIDINE GLUCONATE 4 %
TAKE 1 TABLET BY ORAL ROUTE DAILY LIQUID (ML) TOPICAL 1
Qty: 0 | Refills: 0 | Status: ACTIVE | COMMUNITY
Start: 1900-01-01

## 2022-08-02 RX ORDER — HYDROCHLOROTHIAZIDE 25 MG/1
TABLET ORAL
Qty: 0 | Refills: 0 | Status: DISCONTINUED | COMMUNITY
Start: 1900-01-01

## 2022-08-02 RX ORDER — VENLAFAXINE HYDROCHLORIDE 75 MG/1
TAKE 1 CAPSULE (75 MG) BY ORAL ROUTE ONCE DAILY CAPSULE, EXTENDED RELEASE ORAL 1
Qty: 0 | Refills: 0 | Status: ACTIVE | COMMUNITY
Start: 1900-01-01

## 2022-08-02 RX ORDER — ASPIRIN 81 MG/1
TAKE 1 TABLET (81 MG) BY ORAL ROUTE ONCE DAILY TABLET, COATED ORAL 1
Qty: 0 | Refills: 0 | Status: ACTIVE | COMMUNITY
Start: 1900-01-01

## 2022-08-02 RX ORDER — ALBUTEROL SULFATE 90 UG/1
AEROSOL, METERED RESPIRATORY (INHALATION)
Qty: 0 | Refills: 0 | Status: ACTIVE | COMMUNITY
Start: 1900-01-01

## 2022-08-02 NOTE — HPI-TODAY'S VISIT:
Generally has been doing well until recent episode palpitations was seen at ER and found to have hypokalemia.  This was thought to be caused by HCTZ now stopped  she had small amounts of diarrhea but mostly constipation and takes a laxative at intervals.  She feels like she probably has complete evacuation but recently has noted some sharp rectal discomfort whenever she moves her bowels which stops is immediately after the bowel movement with no residual soreness and no apparent mass or tenderness around the rectal opening.  Last colonoscopy was several years ago with AVMs in the past but currently she is having no bleeding.  K+ has been repleted and is being followed by her primary doctor.

## 2022-12-06 ENCOUNTER — OFFICE VISIT (OUTPATIENT)
Dept: RURAL CLINIC 6 | Facility: CLINIC | Age: 77
End: 2022-12-06

## 2022-12-06 RX ORDER — TRAZODONE HYDROCHLORIDE 150 MG/1
TAKE 1 TABLET BY ORAL ROUTE DAILY TABLET ORAL 1
Qty: 0 | Refills: 0 | COMMUNITY
Start: 1900-01-01

## 2022-12-06 RX ORDER — VENLAFAXINE HYDROCHLORIDE 75 MG/1
TAKE 1 CAPSULE (75 MG) BY ORAL ROUTE ONCE DAILY CAPSULE, EXTENDED RELEASE ORAL 1
Qty: 0 | Refills: 0 | COMMUNITY
Start: 1900-01-01

## 2022-12-06 RX ORDER — ASPIRIN 81 MG/1
TAKE 1 TABLET (81 MG) BY ORAL ROUTE ONCE DAILY TABLET, COATED ORAL 1
Qty: 0 | Refills: 0 | COMMUNITY
Start: 1900-01-01

## 2022-12-06 RX ORDER — ZOLPIDEM TARTRATE 5 MG/1
TABLET, FILM COATED ORAL
Qty: 0 | Refills: 0 | COMMUNITY
Start: 1900-01-01

## 2022-12-06 RX ORDER — VENLAFAXINE HYDROCHLORIDE 75 MG/1
TABLET ORAL
Qty: 0 | Refills: 0 | COMMUNITY
Start: 1900-01-01

## 2022-12-06 RX ORDER — ALBUTEROL SULFATE 90 UG/1
AEROSOL, METERED RESPIRATORY (INHALATION)
Qty: 0 | Refills: 0 | COMMUNITY
Start: 1900-01-01

## 2022-12-06 RX ORDER — CHLORHEXIDINE GLUCONATE 4 %
TAKE 1 TABLET BY ORAL ROUTE DAILY LIQUID (ML) TOPICAL 1
Qty: 0 | Refills: 0 | COMMUNITY
Start: 1900-01-01

## 2023-07-27 ENCOUNTER — OFFICE VISIT (OUTPATIENT)
Dept: URBAN - METROPOLITAN AREA CLINIC 54 | Facility: CLINIC | Age: 78
End: 2023-07-27
Payer: MEDICARE

## 2023-07-27 VITALS
TEMPERATURE: 96.46 F | SYSTOLIC BLOOD PRESSURE: 121 MMHG | WEIGHT: 143.8 LBS | DIASTOLIC BLOOD PRESSURE: 69 MMHG | HEART RATE: 60 BPM | HEIGHT: 62 IN | BODY MASS INDEX: 26.46 KG/M2

## 2023-07-27 DIAGNOSIS — R19.4 CHANGE IN BOWEL HABITS: ICD-10-CM

## 2023-07-27 PROCEDURE — 99213 OFFICE O/P EST LOW 20 MIN: CPT | Performed by: STUDENT IN AN ORGANIZED HEALTH CARE EDUCATION/TRAINING PROGRAM

## 2023-07-27 RX ORDER — VENLAFAXINE HYDROCHLORIDE 75 MG/1
TABLET ORAL
Qty: 0 | Refills: 0 | COMMUNITY
Start: 1900-01-01

## 2023-07-27 RX ORDER — VENLAFAXINE HYDROCHLORIDE 75 MG/1
TAKE 1 CAPSULE (75 MG) BY ORAL ROUTE ONCE DAILY CAPSULE, EXTENDED RELEASE ORAL 1
Qty: 0 | Refills: 0 | COMMUNITY
Start: 1900-01-01

## 2023-07-27 NOTE — HPI-TODAY'S VISIT:
--8/2/2022-- Generally has been doing well until recent episode palpitations was seen at ER and found to have hypokalemia.  This was thought to be caused by HCTZ now stopped  she had small amounts of diarrhea but mostly constipation and takes a laxative at intervals.  She feels like she probably has complete evacuation but recently has noted some sharp rectal discomfort whenever she moves her bowels which stops is immediately after the bowel movement with no residual soreness and no apparent mass or tenderness around the rectal opening.  Last colonoscopy was several years ago with AVMs in the past but currently she is having no bleeding.  K+ has been repleted and is being followed by her primary doctor.  -7/27/2023-  Patient returns today with plaint of change in bowel habits.  She states she had acute diarrhea for which she was given antibiotics.  Diarrhea improved but now she developed constipation.  She will need Colace to have a bowel movement.  She is having a bowel movement every 4 days they can only be achieved with the help of Colace.  Recently, she has been able to have bowel movements without the help of Colace.  She think she is overall improving.  She denies any rectal bleeding.  She has baseline lower abdominal crampy pain.

## 2023-07-27 NOTE — EXAM-PHYSICAL EXAM
General: Well appearing. No acute distress. HEENT: Anicteric sclerae Cardiovascular: Normal heart rate Respiratory: Breathing comfortably without conversational dyspnea Abdomen:  non-distended, soft, non-tender Rectal: Deferred Skin: without visible rashes on examined areas. Musculoskeletal: poor balance when ambulated. Labored ambulation. Neuro: No gross focal deficits. Alert and oriented. Psych: Appropriate mood and affect.

## 2023-12-05 ENCOUNTER — OFFICE VISIT (OUTPATIENT)
Dept: URBAN - METROPOLITAN AREA CLINIC 54 | Facility: CLINIC | Age: 78
End: 2023-12-05

## 2023-12-05 RX ORDER — VENLAFAXINE HYDROCHLORIDE 75 MG/1
TAKE 1 CAPSULE (75 MG) BY ORAL ROUTE ONCE DAILY CAPSULE, EXTENDED RELEASE ORAL 1
Qty: 0 | Refills: 0 | COMMUNITY
Start: 1900-01-01

## 2023-12-05 RX ORDER — VENLAFAXINE HYDROCHLORIDE 75 MG/1
TABLET ORAL
Qty: 0 | Refills: 0 | COMMUNITY
Start: 1900-01-01

## 2023-12-15 ENCOUNTER — CLAIMS CREATED FROM THE CLAIM WINDOW (OUTPATIENT)
Dept: URBAN - METROPOLITAN AREA CLINIC 54 | Facility: CLINIC | Age: 78
End: 2023-12-15
Payer: MEDICARE

## 2023-12-15 VITALS
WEIGHT: 142 LBS | TEMPERATURE: 97.3 F | DIASTOLIC BLOOD PRESSURE: 71 MMHG | SYSTOLIC BLOOD PRESSURE: 101 MMHG | HEIGHT: 62 IN | HEART RATE: 72 BPM | BODY MASS INDEX: 26.13 KG/M2

## 2023-12-15 DIAGNOSIS — K59.00 CONSTIPATION: ICD-10-CM

## 2023-12-15 DIAGNOSIS — K59.01 CONSTIPATION: ICD-10-CM

## 2023-12-15 PROCEDURE — 99213 OFFICE O/P EST LOW 20 MIN: CPT | Performed by: PHYSICIAN ASSISTANT

## 2023-12-15 RX ORDER — LORATADINE 10 MG
1 PACKET MIXED WITH 8 OUNCES OF FLUID TABLET,DISINTEGRATING ORAL ONCE A DAY
Qty: 30 | Refills: 3 | OUTPATIENT
Start: 2023-12-15 | End: 2024-04-13

## 2023-12-15 RX ORDER — ALBUTEROL SULFATE 2.5 MG/3ML
3 ML AS NEEDED SOLUTION RESPIRATORY (INHALATION)
Status: ACTIVE | COMMUNITY

## 2023-12-15 RX ORDER — VENLAFAXINE HYDROCHLORIDE 75 MG/1
TABLET ORAL
Qty: 0 | Refills: 0 | Status: ACTIVE | COMMUNITY
Start: 1900-01-01

## 2023-12-15 RX ORDER — VENLAFAXINE HYDROCHLORIDE 75 MG/1
TAKE 1 CAPSULE (75 MG) BY ORAL ROUTE ONCE DAILY CAPSULE, EXTENDED RELEASE ORAL 1
Qty: 0 | Refills: 0 | Status: ACTIVE | COMMUNITY
Start: 1900-01-01

## 2023-12-15 NOTE — HPI-TODAY'S VISIT:
--8/2/2022-- Generally has been doing well until recent episode palpitations was seen at ER and found to have hypokalemia.  This was thought to be caused by HCTZ now stopped  she had small amounts of diarrhea but mostly constipation and takes a laxative at intervals.  She feels like she probably has complete evacuation but recently has noted some sharp rectal discomfort whenever she moves her bowels which stops is immediately after the bowel movement with no residual soreness and no apparent mass or tenderness around the rectal opening.  Last colonoscopy was several years ago with AVMs in the past but currently she is having no bleeding.  K+ has been repleted and is being followed by her primary doctor.  -7/27/2023-  Patient returns today with plaint of change in bowel habits.  She states she had acute diarrhea for which she was given antibiotics.  Diarrhea improved but now she developed constipation.  She will need Colace to have a bowel movement.  She is having a bowel movement every 4 days they can only be achieved with the help of Colace.  Recently, she has been able to have bowel movements without the help of Colace.  She think she is overall improving.  She denies any rectal bleeding.  She has baseline lower abdominal crampy pain.  --12/15/23-- Patient presents for routine follow up. Still with constipation. Last BM was today, but often only once a week. Was seen recently at the hospital for constipation. Uses laxatives intermittently. Denies rectal bleeding.

## 2023-12-15 NOTE — PHYSICAL EXAM CONSTITUTIONAL:
well developed, well nourished , in no acute distress , ambulates with walker, normal communication ability

## 2023-12-24 NOTE — PHYSICAL EXAM NECK/THYROID:
normal appearance , without tenderness upon palpation , no deformities , trachea midline , Thyroid normal size , no thyroid nodules , no masses , no JVD , thyroid nontender
unknown

## 2024-01-25 ENCOUNTER — OFFICE VISIT (OUTPATIENT)
Dept: URBAN - METROPOLITAN AREA CLINIC 54 | Facility: CLINIC | Age: 79
End: 2024-01-25
Payer: MEDICARE

## 2024-01-25 VITALS
DIASTOLIC BLOOD PRESSURE: 63 MMHG | BODY MASS INDEX: 26.13 KG/M2 | SYSTOLIC BLOOD PRESSURE: 89 MMHG | WEIGHT: 142 LBS | TEMPERATURE: 97.3 F | HEIGHT: 62 IN | HEART RATE: 78 BPM

## 2024-01-25 DIAGNOSIS — K59.01 CONSTIPATION: ICD-10-CM

## 2024-01-25 PROCEDURE — 99213 OFFICE O/P EST LOW 20 MIN: CPT | Performed by: PHYSICIAN ASSISTANT

## 2024-01-25 RX ORDER — LORATADINE 10 MG
1 PACKET MIXED WITH 8 OUNCES OF FLUID TABLET,DISINTEGRATING ORAL ONCE A DAY
Qty: 30 | Refills: 3 | COMMUNITY
Start: 2023-12-15 | End: 2024-04-13

## 2024-01-25 RX ORDER — ATORVASTATIN CALCIUM 20 MG/1
1 TABLET TABLET, FILM COATED ORAL ONCE A DAY
Status: ACTIVE | COMMUNITY

## 2024-01-25 RX ORDER — ALBUTEROL SULFATE 2.5 MG/3ML
3 ML AS NEEDED SOLUTION RESPIRATORY (INHALATION)
COMMUNITY

## 2024-01-25 RX ORDER — VENLAFAXINE HYDROCHLORIDE 75 MG/1
TAKE 1 CAPSULE (75 MG) BY ORAL ROUTE ONCE DAILY CAPSULE, EXTENDED RELEASE ORAL 1
Qty: 0 | Refills: 0 | COMMUNITY
Start: 1900-01-01

## 2024-01-25 RX ORDER — VENLAFAXINE HYDROCHLORIDE 75 MG/1
TABLET ORAL
Qty: 0 | Refills: 0 | COMMUNITY
Start: 1900-01-01

## 2024-01-25 NOTE — HPI-TODAY'S VISIT:
--8/2/2022-- Generally has been doing well until recent episode palpitations was seen at ER and found to have hypokalemia.  This was thought to be caused by HCTZ now stopped  she had small amounts of diarrhea but mostly constipation and takes a laxative at intervals.  She feels like she probably has complete evacuation but recently has noted some sharp rectal discomfort whenever she moves her bowels which stops is immediately after the bowel movement with no residual soreness and no apparent mass or tenderness around the rectal opening.  Last colonoscopy was several years ago with AVMs in the past but currently she is having no bleeding.  K+ has been repleted and is being followed by her primary doctor.  -7/27/2023-  Patient returns today with plaint of change in bowel habits.  She states she had acute diarrhea for which she was given antibiotics.  Diarrhea improved but now she developed constipation.  She will need Colace to have a bowel movement.  She is having a bowel movement every 4 days they can only be achieved with the help of Colace.  Recently, she has been able to have bowel movements without the help of Colace.  She think she is overall improving.  She denies any rectal bleeding.  She has baseline lower abdominal crampy pain.  --12/15/23-- Patient presents for routine follow up. Still with constipation. Last BM was today, but often only once a week. Was seen recently at the hospital for constipation. Uses laxatives intermittently. Denies rectal bleeding.  1/25/24: Patient presents for routine follow up. Patient reports since starting MiraLAX, she has noted fecal leaking after having BMs. Denies similar symptoms previously. She is not currently on a fiber supplement.

## 2024-03-21 ENCOUNTER — OV EP (OUTPATIENT)
Dept: RURAL CLINIC 6 | Facility: CLINIC | Age: 79
End: 2024-03-21
Payer: MEDICARE

## 2024-03-21 VITALS
SYSTOLIC BLOOD PRESSURE: 101 MMHG | WEIGHT: 141.4 LBS | HEIGHT: 62 IN | BODY MASS INDEX: 26.02 KG/M2 | TEMPERATURE: 97.3 F | HEART RATE: 60 BPM | DIASTOLIC BLOOD PRESSURE: 70 MMHG

## 2024-03-21 DIAGNOSIS — K59.00 CONSTIPATION: ICD-10-CM

## 2024-03-21 PROCEDURE — 99213 OFFICE O/P EST LOW 20 MIN: CPT | Performed by: PHYSICIAN ASSISTANT

## 2024-03-21 RX ORDER — VENLAFAXINE HYDROCHLORIDE 75 MG/1
TABLET ORAL
Qty: 0 | Refills: 0 | Status: ACTIVE | COMMUNITY
Start: 1900-01-01

## 2024-03-21 RX ORDER — ATORVASTATIN CALCIUM 20 MG/1
1 TABLET TABLET, FILM COATED ORAL ONCE A DAY
Status: ACTIVE | COMMUNITY

## 2024-03-21 RX ORDER — VENLAFAXINE HYDROCHLORIDE 75 MG/1
TAKE 1 CAPSULE (75 MG) BY ORAL ROUTE ONCE DAILY CAPSULE, EXTENDED RELEASE ORAL 1
Qty: 0 | Refills: 0 | Status: ACTIVE | COMMUNITY
Start: 1900-01-01

## 2024-03-21 RX ORDER — LORATADINE 10 MG
1 PACKET MIXED WITH 8 OUNCES OF FLUID TABLET,DISINTEGRATING ORAL ONCE A DAY
Qty: 30 | Refills: 3 | Status: ACTIVE | COMMUNITY
Start: 2023-12-15 | End: 2024-04-13

## 2024-03-21 RX ORDER — ALBUTEROL SULFATE 2.5 MG/3ML
3 ML AS NEEDED SOLUTION RESPIRATORY (INHALATION)
Status: ACTIVE | COMMUNITY

## 2024-03-21 NOTE — HPI-TODAY'S VISIT:
--8/2/2022-- Generally has been doing well until recent episode palpitations was seen at ER and found to have hypokalemia.  This was thought to be caused by HCTZ now stopped  she had small amounts of diarrhea but mostly constipation and takes a laxative at intervals.  She feels like she probably has complete evacuation but recently has noted some sharp rectal discomfort whenever she moves her bowels which stops is immediately after the bowel movement with no residual soreness and no apparent mass or tenderness around the rectal opening.  Last colonoscopy was several years ago with AVMs in the past but currently she is having no bleeding.  K+ has been repleted and is being followed by her primary doctor.  -7/27/2023-  Patient returns today with plaint of change in bowel habits.  She states she had acute diarrhea for which she was given antibiotics.  Diarrhea improved but now she developed constipation.  She will need Colace to have a bowel movement.  She is having a bowel movement every 4 days they can only be achieved with the help of Colace.  Recently, she has been able to have bowel movements without the help of Colace.  She think she is overall improving.  She denies any rectal bleeding.  She has baseline lower abdominal crampy pain.  --12/15/23-- Patient presents for routine follow up. Still with constipation. Last BM was today, but often only once a week. Was seen recently at the hospital for constipation. Uses laxatives intermittently. Denies rectal bleeding.  1/25/24: Patient presents for routine follow up. Patient reports since starting MiraLAX, she has noted fecal leaking after having BMs. Denies similar symptoms previously. She is not currently on a fiber supplement.  3/21/24: Patient presents for routine follow-up.  She continues with daily MiraLAX with loose stool and fecal incontinence.  She tried Metamucil for few days but did not tolerate the taste.  She has Benefiber clear at home and willing to try that.

## 2024-05-16 ENCOUNTER — OFFICE VISIT (OUTPATIENT)
Dept: RURAL CLINIC 6 | Facility: CLINIC | Age: 79
End: 2024-05-16
Payer: MEDICARE

## 2024-05-16 ENCOUNTER — DASHBOARD ENCOUNTERS (OUTPATIENT)
Age: 79
End: 2024-05-16

## 2024-05-16 VITALS
BODY MASS INDEX: 26.02 KG/M2 | HEART RATE: 65 BPM | SYSTOLIC BLOOD PRESSURE: 124 MMHG | TEMPERATURE: 98 F | DIASTOLIC BLOOD PRESSURE: 67 MMHG | HEIGHT: 62 IN | WEIGHT: 141.4 LBS

## 2024-05-16 DIAGNOSIS — K59.00 CONSTIPATION: ICD-10-CM

## 2024-05-16 PROCEDURE — 99213 OFFICE O/P EST LOW 20 MIN: CPT | Performed by: PHYSICIAN ASSISTANT

## 2024-05-16 RX ORDER — ATORVASTATIN CALCIUM 20 MG/1
1 TABLET TABLET, FILM COATED ORAL ONCE A DAY
Status: ACTIVE | COMMUNITY

## 2024-05-16 RX ORDER — ALBUTEROL SULFATE 2.5 MG/3ML
3 ML AS NEEDED SOLUTION RESPIRATORY (INHALATION)
Status: ACTIVE | COMMUNITY

## 2024-05-16 RX ORDER — VENLAFAXINE HYDROCHLORIDE 75 MG/1
TABLET ORAL
Qty: 0 | Refills: 0 | Status: ACTIVE | COMMUNITY
Start: 1900-01-01

## 2024-05-16 RX ORDER — VENLAFAXINE HYDROCHLORIDE 75 MG/1
TAKE 1 CAPSULE (75 MG) BY ORAL ROUTE ONCE DAILY CAPSULE, EXTENDED RELEASE ORAL 1
Qty: 0 | Refills: 0 | Status: ACTIVE | COMMUNITY
Start: 1900-01-01

## 2024-08-15 ENCOUNTER — OFFICE VISIT (OUTPATIENT)
Dept: RURAL CLINIC 6 | Facility: CLINIC | Age: 79
End: 2024-08-15

## 2024-08-15 RX ORDER — ATORVASTATIN CALCIUM 20 MG/1
1 TABLET TABLET, FILM COATED ORAL ONCE A DAY
COMMUNITY

## 2024-08-15 RX ORDER — VENLAFAXINE HYDROCHLORIDE 75 MG/1
TAKE 1 CAPSULE (75 MG) BY ORAL ROUTE ONCE DAILY CAPSULE, EXTENDED RELEASE ORAL 1
Qty: 0 | Refills: 0 | COMMUNITY
Start: 1900-01-01

## 2024-08-15 RX ORDER — VENLAFAXINE HYDROCHLORIDE 75 MG/1
TABLET ORAL
Qty: 0 | Refills: 0 | COMMUNITY
Start: 1900-01-01

## 2024-08-15 RX ORDER — ALBUTEROL SULFATE 2.5 MG/3ML
3 ML AS NEEDED SOLUTION RESPIRATORY (INHALATION)
COMMUNITY

## 2024-08-15 NOTE — HPI-TODAY'S VISIT:
--8/2/2022-- Generally has been doing well until recent episode palpitations was seen at ER and found to have hypokalemia.  This was thought to be caused by HCTZ now stopped  she had small amounts of diarrhea but mostly constipation and takes a laxative at intervals.  She feels like she probably has complete evacuation but recently has noted some sharp rectal discomfort whenever she moves her bowels which stops is immediately after the bowel movement with no residual soreness and no apparent mass or tenderness around the rectal opening.  Last colonoscopy was several years ago with AVMs in the past but currently she is having no bleeding.  K+ has been repleted and is being followed by her primary doctor.  -7/27/2023-  Patient returns today with plaint of change in bowel habits.  She states she had acute diarrhea for which she was given antibiotics.  Diarrhea improved but now she developed constipation.  She will need Colace to have a bowel movement.  She is having a bowel movement every 4 days they can only be achieved with the help of Colace.  Recently, she has been able to have bowel movements without the help of Colace.  She think she is overall improving.  She denies any rectal bleeding.  She has baseline lower abdominal crampy pain.  --12/15/23-- Patient presents for routine follow up. Still with constipation. Last BM was today, but often only once a week. Was seen recently at the hospital for constipation. Uses laxatives intermittently. Denies rectal bleeding.  1/25/24: Patient presents for routine follow up. Patient reports since starting MiraLAX, she has noted fecal leaking after having BMs. Denies similar symptoms previously. She is not currently on a fiber supplement.  3/21/24: Patient presents for routine follow-up.  She continues with daily MiraLAX with loose stool and fecal incontinence.  She tried Metamucil for few days but did not tolerate the taste.  She has Benefiber clear at home and willing to try that.  5/16/24: Patient presents for routine follow up. She is currently taking MiraLAX + Benefiber. She reports having multiple BMs daily. Infrequent fecal leaking. Abdominal cramping that is typically alleviated with BMs.

## 2024-08-29 ENCOUNTER — OFFICE VISIT (OUTPATIENT)
Dept: RURAL CLINIC 6 | Facility: CLINIC | Age: 79
End: 2024-08-29
Payer: MEDICARE

## 2024-08-29 VITALS
DIASTOLIC BLOOD PRESSURE: 90 MMHG | HEART RATE: 113 BPM | SYSTOLIC BLOOD PRESSURE: 125 MMHG | BODY MASS INDEX: 25.58 KG/M2 | WEIGHT: 139 LBS | HEIGHT: 62 IN | TEMPERATURE: 98.6 F

## 2024-08-29 DIAGNOSIS — K59.09 CHRONIC CONSTIPATION: ICD-10-CM

## 2024-08-29 PROCEDURE — 99213 OFFICE O/P EST LOW 20 MIN: CPT | Performed by: PHYSICIAN ASSISTANT

## 2024-08-29 RX ORDER — ALBUTEROL SULFATE 2.5 MG/3ML
3 ML AS NEEDED SOLUTION RESPIRATORY (INHALATION)
Status: ACTIVE | COMMUNITY

## 2024-08-29 RX ORDER — ATORVASTATIN CALCIUM 20 MG/1
1 TABLET TABLET, FILM COATED ORAL ONCE A DAY
Status: ACTIVE | COMMUNITY

## 2024-08-29 RX ORDER — VENLAFAXINE HYDROCHLORIDE 75 MG/1
TAKE 1 CAPSULE (75 MG) BY ORAL ROUTE ONCE DAILY CAPSULE, EXTENDED RELEASE ORAL 1
Qty: 0 | Refills: 0 | Status: ACTIVE | COMMUNITY
Start: 1900-01-01

## 2024-08-29 RX ORDER — VENLAFAXINE HYDROCHLORIDE 75 MG/1
TABLET ORAL
Qty: 0 | Refills: 0 | Status: ACTIVE | COMMUNITY
Start: 1900-01-01

## 2024-08-29 NOTE — HPI-TODAY'S VISIT:
--8/2/2022-- Generally has been doing well until recent episode palpitations was seen at ER and found to have hypokalemia.  This was thought to be caused by HCTZ now stopped  she had small amounts of diarrhea but mostly constipation and takes a laxative at intervals.  She feels like she probably has complete evacuation but recently has noted some sharp rectal discomfort whenever she moves her bowels which stops is immediately after the bowel movement with no residual soreness and no apparent mass or tenderness around the rectal opening.  Last colonoscopy was several years ago with AVMs in the past but currently she is having no bleeding.  K+ has been repleted and is being followed by her primary doctor.  -7/27/2023-  Patient returns today with plaint of change in bowel habits.  She states she had acute diarrhea for which she was given antibiotics.  Diarrhea improved but now she developed constipation.  She will need Colace to have a bowel movement.  She is having a bowel movement every 4 days they can only be achieved with the help of Colace.  Recently, she has been able to have bowel movements without the help of Colace.  She think she is overall improving.  She denies any rectal bleeding.  She has baseline lower abdominal crampy pain.  --12/15/23-- Patient presents for routine follow up. Still with constipation. Last BM was today, but often only once a week. Was seen recently at the hospital for constipation. Uses laxatives intermittently. Denies rectal bleeding.  1/25/24: Patient presents for routine follow up. Patient reports since starting MiraLAX, she has noted fecal leaking after having BMs. Denies similar symptoms previously. She is not currently on a fiber supplement.  3/21/24: Patient presents for routine follow-up.  She continues with daily MiraLAX with loose stool and fecal incontinence.  She tried Metamucil for few days but did not tolerate the taste.  She has Benefiber clear at home and willing to try that.  5/16/24: Patient presents for routine follow up. She is currently taking MiraLAX + Benefiber. She reports having multiple BMs daily. Infrequent fecal leaking. Abdominal cramping that is typically alleviated with BMs.  8/29/24: Pt presents for routine follow up. She is a poor historian. She has loose stool QOD. On one tsp of fiber + MiraLAX PRN. She finds increased emotional stress causes diarrhea. She was in the hospital last week for a reported kidney stone.

## 2024-11-21 ENCOUNTER — OFFICE VISIT (OUTPATIENT)
Dept: RURAL CLINIC 6 | Facility: CLINIC | Age: 79
End: 2024-11-21

## 2024-11-21 RX ORDER — VENLAFAXINE HYDROCHLORIDE 75 MG/1
TABLET ORAL
Qty: 0 | Refills: 0 | COMMUNITY
Start: 1900-01-01

## 2024-11-21 RX ORDER — ATORVASTATIN CALCIUM 20 MG/1
1 TABLET TABLET, FILM COATED ORAL ONCE A DAY
COMMUNITY

## 2024-11-21 RX ORDER — VENLAFAXINE HYDROCHLORIDE 75 MG/1
TAKE 1 CAPSULE (75 MG) BY ORAL ROUTE ONCE DAILY CAPSULE, EXTENDED RELEASE ORAL 1
Qty: 0 | Refills: 0 | COMMUNITY
Start: 1900-01-01

## 2024-11-21 RX ORDER — ALBUTEROL SULFATE 2.5 MG/3ML
3 ML AS NEEDED SOLUTION RESPIRATORY (INHALATION)
COMMUNITY